# Patient Record
Sex: FEMALE | Race: WHITE | Employment: PART TIME | URBAN - METROPOLITAN AREA
[De-identification: names, ages, dates, MRNs, and addresses within clinical notes are randomized per-mention and may not be internally consistent; named-entity substitution may affect disease eponyms.]

---

## 2018-07-29 ENCOUNTER — APPOINTMENT (OUTPATIENT)
Dept: CT IMAGING | Age: 23
End: 2018-07-29
Payer: COMMERCIAL

## 2018-07-29 ENCOUNTER — HOSPITAL ENCOUNTER (EMERGENCY)
Age: 23
Discharge: HOME | End: 2018-07-29
Payer: COMMERCIAL

## 2018-07-29 VITALS
OXYGEN SATURATION: 99 % | RESPIRATION RATE: 16 BRPM | HEART RATE: 90 BPM | SYSTOLIC BLOOD PRESSURE: 134 MMHG | DIASTOLIC BLOOD PRESSURE: 70 MMHG | WEIGHT: 210 LBS | HEIGHT: 66 IN | TEMPERATURE: 97.9 F | BODY MASS INDEX: 33.75 KG/M2
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
## 2018-07-29 DIAGNOSIS — S09.90XA INJURY OF HEAD, INITIAL ENCOUNTER: ICD-10-CM

## 2018-07-29 DIAGNOSIS — R51.9 ACUTE NONINTRACTABLE HEADACHE, UNSPECIFIED HEADACHE TYPE: Primary | ICD-10-CM

## 2018-07-29 LAB
CHP ED QC CHECK: NORMAL
PREGNANCY TEST URINE, POC: NORMAL

## 2018-07-29 PROCEDURE — 99284 EMERGENCY DEPT VISIT MOD MDM: CPT

## 2018-07-29 PROCEDURE — 70450 CT HEAD/BRAIN W/O DYE: CPT

## 2018-07-29 PROCEDURE — 72125 CT NECK SPINE W/O DYE: CPT

## 2019-05-15 ENCOUNTER — HOSPITAL ENCOUNTER (EMERGENCY)
Age: 24
Discharge: HOME | End: 2019-05-16
Attending: EMERGENCY MEDICINE
Payer: COMMERCIAL

## 2019-05-15 ENCOUNTER — APPOINTMENT (OUTPATIENT)
Dept: GENERAL RADIOLOGY | Age: 24
End: 2019-05-15
Payer: COMMERCIAL

## 2019-05-15 VITALS
WEIGHT: 180 LBS | SYSTOLIC BLOOD PRESSURE: 112 MMHG | BODY MASS INDEX: 28.93 KG/M2 | HEIGHT: 66 IN | OXYGEN SATURATION: 98 % | TEMPERATURE: 98.1 F | DIASTOLIC BLOOD PRESSURE: 72 MMHG | HEART RATE: 79 BPM | RESPIRATION RATE: 16 BRPM

## 2019-05-15 DIAGNOSIS — S52.612A DISPLACED FRACTURE OF LEFT ULNA STYLOID PROCESS, INITIAL ENCOUNTER FOR CLOSED FRACTURE: ICD-10-CM

## 2019-05-15 DIAGNOSIS — S52.352A CLOSED DISPLACED COMMINUTED FRACTURE OF SHAFT OF LEFT RADIUS, INITIAL ENCOUNTER: Primary | ICD-10-CM

## 2019-05-15 LAB — HCG(URINE) PREGNANCY TEST: NEGATIVE

## 2019-05-15 PROCEDURE — 96374 THER/PROPH/DIAG INJ IV PUSH: CPT

## 2019-05-15 PROCEDURE — 99284 EMERGENCY DEPT VISIT MOD MDM: CPT

## 2019-05-15 PROCEDURE — 6360000002 HC RX W HCPCS: Performed by: STUDENT IN AN ORGANIZED HEALTH CARE EDUCATION/TRAINING PROGRAM

## 2019-05-15 PROCEDURE — 73110 X-RAY EXAM OF WRIST: CPT

## 2019-05-15 PROCEDURE — 81025 URINE PREGNANCY TEST: CPT

## 2019-05-15 RX ORDER — FENTANYL CITRATE 50 UG/ML
50 INJECTION, SOLUTION INTRAMUSCULAR; INTRAVENOUS ONCE
Status: COMPLETED | OUTPATIENT
Start: 2019-05-15 | End: 2019-05-15

## 2019-05-15 RX ADMIN — FENTANYL CITRATE 50 MCG: 50 INJECTION INTRAMUSCULAR; INTRAVENOUS at 22:13

## 2019-05-15 RX ADMIN — FENTANYL CITRATE 50 MCG: 50 INJECTION INTRAMUSCULAR; INTRAVENOUS at 20:15

## 2019-05-15 ASSESSMENT — PAIN DESCRIPTION - FREQUENCY
FREQUENCY: CONTINUOUS
FREQUENCY: CONTINUOUS

## 2019-05-15 ASSESSMENT — PAIN DESCRIPTION - ORIENTATION
ORIENTATION: LEFT

## 2019-05-15 ASSESSMENT — PAIN DESCRIPTION - PAIN TYPE
TYPE: ACUTE PAIN

## 2019-05-15 ASSESSMENT — PAIN SCALES - GENERAL
PAINLEVEL_OUTOF10: 10

## 2019-05-15 ASSESSMENT — ENCOUNTER SYMPTOMS
SINUS PAIN: 0
NAUSEA: 0
DIARRHEA: 0
VOMITING: 0
SHORTNESS OF BREATH: 0
COUGH: 0
EYE REDNESS: 0
ABDOMINAL PAIN: 0
EYE PAIN: 0
CONSTIPATION: 0
SORE THROAT: 0

## 2019-05-15 ASSESSMENT — PAIN DESCRIPTION - LOCATION
LOCATION: ARM

## 2019-05-15 ASSESSMENT — PAIN DESCRIPTION - DESCRIPTORS: DESCRIPTORS: HEAVINESS;SHARP

## 2019-05-15 NOTE — ED PROVIDER NOTES
The patient is a 21-year-old female presenting with arm injury. Patient fell on left wrist playing tennis 30 minutes prior to arrival. Patient was running for a ball when she reached back and landed on her wrist and a [address] injury. Patient is holding her left wrist on her side and unwilling to remove her from the side. They are neurovascularly intact in that left upper extremity. She has pain and bony tenderness to her left wrist. Some swelling present in left wrist. She has ice placed over top. She is not taking anything for pain. She has no prior injuries to the area. There is decreased range of motion secondary to pain. The history is provided by the patient. Review of Systems   Constitutional: Negative for chills and fever. HENT: Negative for congestion, sinus pain and sore throat. Eyes: Negative for pain and redness. Respiratory: Negative for cough and shortness of breath. Cardiovascular: Negative for chest pain and palpitations. Gastrointestinal: Negative for abdominal pain, constipation, diarrhea, nausea and vomiting. Endocrine: Negative for polyuria. Genitourinary: Negative for difficulty urinating, dysuria, frequency and hematuria. Musculoskeletal: Positive for joint swelling (Left wrist). Negative for neck pain. Skin: Negative. Neurological: Negative for dizziness, weakness, light-headedness and headaches. Hematological: Negative. Psychiatric/Behavioral: Negative for agitation and confusion. Physical Exam   Constitutional: She is oriented to person, place, and time. She appears well-developed and well-nourished. HENT:   Head: Normocephalic and atraumatic. Eyes: Pupils are equal, round, and reactive to light. Conjunctivae and EOM are normal.   Neck: Normal range of motion. No JVD present. Cardiovascular: Normal rate, regular rhythm, normal heart sounds and intact distal pulses.    Pulmonary/Chest: Effort normal and breath sounds normal. No respiratory distress. She has no wheezes. Abdominal: Soft. Bowel sounds are normal. She exhibits no distension and no mass. There is no tenderness. There is no guarding. Musculoskeletal: She exhibits no edema. Left elbow: She exhibits decreased range of motion. Left wrist: She exhibits decreased range of motion, tenderness, bony tenderness, swelling and crepitus. She exhibits no deformity and no laceration. Right hand: Normal sensation noted. Decreased strength: Unable to obtain due to pain in the left wrist.   No snuffbox tenderness. Neurological: She is oriented to person, place, and time. Skin: Skin is warm and dry. Psychiatric: She has a normal mood and affect. Vitals reviewed. Procedures    MDM    ED Course as of May 15 2306   Wed May 15, 2019   2144 1. Comminuted displaced intra-articular fracture of the distal left  radius. 2. Fracture of the ulnar styloid process. [WL]   2153 Spoke with Jonelle Yin, answering for Dr. Laurence Crawley, orthopedics, he states he described in right now and will come to see the patient once he has finished.    [WL]   2232 Ice was placed over the left wrist and the patient was given fentanyl for pain. [WL]      ED Course User Index  [WL] Cinthia Harvey DO     Patient presents to the ED for   Chief Complaint   Patient presents with    Arm Injury     left wrist, fell on it while playing tennis 30 min pta    . Patient continues to be non-toxic on re-evaluation. Findings were discussed with the patient and reasons to immediately return to the ED were articulated to them. They will follow-up with their PMD.      --------------------------------------------- PAST HISTORY ---------------------------------------------  Past Medical History:  has a past medical history of Anxiety. Past Surgical History:  has a past surgical history that includes Cholecystectomy. Social History:  reports that she quit smoking about 2 years ago.  She has a 0.75 pack-year smoking history. She has never used smokeless tobacco. She reports that she drinks alcohol. She reports that she does not use drugs. Family History: family history includes Diabetes in her paternal uncle; Other in her mother. The patients home medications have been reviewed. Allergies: Patient has no known allergies. -------------------------------------------------- RESULTS -------------------------------------------------  Labs:  Results for orders placed or performed during the hospital encounter of 05/15/19   Pregnancy, Urine   Result Value Ref Range    HCG(Urine) Pregnancy Test NEGATIVE NEGATIVE       Radiology:  XR WRIST LEFT (MIN 3 VIEWS)   Final Result   1. Comminuted displaced intra-articular fracture of the distal left   radius. 2. Fracture of the ulnar styloid process. ------------------------- NURSING NOTES AND VITALS REVIEWED ---------------------------  Date / Time Roomed:  5/15/2019  7:51 PM  ED Bed Assignment:  02/02    The nursing notes within the ED encounter and vital signs as below have been reviewed. /72   Pulse 79   Temp 98.1 °F (36.7 °C) (Oral)   Resp 16   Ht 5' 6\" (1.676 m)   Wt 180 lb (81.6 kg)   LMP 05/01/2019   SpO2 98%   BMI 29.05 kg/m²           --------------------------------- ADDITIONAL PROVIDER NOTES ---------------------------------  At this time the patient is without objective evidence of an acute process requiring hospitalization or inpatient management. They have remained hemodynamically stable throughout their entire ED visit and are stable for discharge with outpatient follow-up. The plan has been discussed in detail and they are aware of the specific conditions for emergent return, as well as the importance of follow-up. New Prescriptions    No medications on file       Diagnosis:  1. Closed displaced comminuted fracture of shaft of left radius, initial encounter    2.  Displaced fracture of left ulna styloid process, initial encounter for closed fracture        Disposition:  Patient's disposition: Discharge  Patient's condition is stable.          Julisa Conrad DO  Resident  05/15/19 1476

## 2019-05-16 ENCOUNTER — APPOINTMENT (OUTPATIENT)
Dept: GENERAL RADIOLOGY | Age: 24
End: 2019-05-16
Payer: COMMERCIAL

## 2019-05-16 PROCEDURE — 6360000002 HC RX W HCPCS: Performed by: STUDENT IN AN ORGANIZED HEALTH CARE EDUCATION/TRAINING PROGRAM

## 2019-05-16 PROCEDURE — 6370000000 HC RX 637 (ALT 250 FOR IP): Performed by: EMERGENCY MEDICINE

## 2019-05-16 PROCEDURE — 73110 X-RAY EXAM OF WRIST: CPT

## 2019-05-16 PROCEDURE — 96376 TX/PRO/DX INJ SAME DRUG ADON: CPT

## 2019-05-16 RX ORDER — ONDANSETRON 4 MG/1
4 TABLET, ORALLY DISINTEGRATING ORAL ONCE
Status: COMPLETED | OUTPATIENT
Start: 2019-05-16 | End: 2019-05-16

## 2019-05-16 RX ORDER — FENTANYL CITRATE 50 UG/ML
50 INJECTION, SOLUTION INTRAMUSCULAR; INTRAVENOUS ONCE
Status: COMPLETED | OUTPATIENT
Start: 2019-05-16 | End: 2019-05-16

## 2019-05-16 RX ORDER — OXYCODONE HYDROCHLORIDE AND ACETAMINOPHEN 5; 325 MG/1; MG/1
1 TABLET ORAL ONCE
Status: COMPLETED | OUTPATIENT
Start: 2019-05-16 | End: 2019-05-16

## 2019-05-16 RX ORDER — OXYCODONE HYDROCHLORIDE AND ACETAMINOPHEN 5; 325 MG/1; MG/1
1 TABLET ORAL EVERY 6 HOURS PRN
Qty: 12 TABLET | Refills: 0 | Status: SHIPPED | OUTPATIENT
Start: 2019-05-16 | End: 2019-05-19

## 2019-05-16 RX ORDER — ONDANSETRON 4 MG/1
4 TABLET, ORALLY DISINTEGRATING ORAL EVERY 8 HOURS PRN
Qty: 10 TABLET | Refills: 0 | Status: SHIPPED | OUTPATIENT
Start: 2019-05-16 | End: 2020-05-15

## 2019-05-16 RX ADMIN — ONDANSETRON 4 MG: 4 TABLET, ORALLY DISINTEGRATING ORAL at 01:08

## 2019-05-16 RX ADMIN — FENTANYL CITRATE 50 MCG: 50 INJECTION INTRAMUSCULAR; INTRAVENOUS at 00:17

## 2019-05-16 RX ADMIN — OXYCODONE HYDROCHLORIDE AND ACETAMINOPHEN 1 TABLET: 5; 325 TABLET ORAL at 01:07

## 2019-05-16 NOTE — CONSULTS
cooperative, no apparent distress, and appears stated age  MUSCULOSKELETAL:  Left upper Extremity:  · Skins intact circumferentially  · Mild deformity to the distal radius  · Mild edema over the dorsal aspect of the distal radius  · Positive TTP about the distal radius  · Patient unable to flex and extend the wrist secondarily due to pain. Patient is able to flex and extend her fingers  · No pain with palpation about the elbow, proximal radius and ulna, humerus, shoulder  · Sensations intact to light touch in the median, ulnar, radial nerve distribution  · Positive AIN, PIN, ulnar motor nerve function to the hand  · +2/4 radial pulse    Secondary Exam:   · rightUE: No obvious signs of trauma. -TTP to fingers, hand, wrist, forearm, elbow, humerus, shoulder or clavicle. -- Patient able to flex/extend fingers, wrist, elbow and shoulder with active and passive ROM without pain, +2/4 Radial pulse, cap refill <3sec, +AIN/PIN/Radial/Ulnar/Median N, distal sensation grossly intact to C4-T1 dermatomes, compartments soft and compressible. · bilateralLE: No obvious signs of trauma. -TTP to foot, ankle, leg, knee, thigh, hip.-- Patient able to flex/extend toes, ankle, knee and hip with active and passive ROM without pain,+2/4 DP & PT pulses, cap refill <3sec, +5/5 PF/DF/EHL, distal sensation grossly intact to L4-S1 dermatomes, compartments soft and compressible. · Pelvis: -TTP, -Log roll, -Heel strike     DATA:    CBC: No results found for: WBC, RBC, HGB, HCT, MCV, MCH, MCHC, RDW, PLT, MPV  PT/INR:  No results found for: PROTIME, INR    Radiology Review:  X-ray left wrist  Demonstrating a comminuted intra-articular distal radius fracture with dorsal angulation and shortening.  There is also an associated ulnar styloid fracture    IMPRESSION:  · Distal radius fracture  · Ulnar styloid fracture    PLAN:  · Nonweightbearing to the left upper extremity  · After informed consent the skin was cleaned with alcohol and 10 mL of 1% lidocaine was injected for a hematoma block. The patient subsequently underwent closed reduction and was placed in a well-padded sugar tong splint. Patient remained neurovascularly intact pre-and postreduction  · Ice and elevate left upper extremity  · Pain control per ED  · Instructed patient to follow up in office with Dr. Bernarda Menendez.  Instructed patient to call to schedule an appointment  · Discussed with Dr. Bernarda Menendez

## 2019-05-16 NOTE — ED NOTES
Patient aware we are waiting for ortho to come to ED to evaluate her.      Libby Floyd RN  05/15/19 9499

## 2022-12-28 ENCOUNTER — APPOINTMENT (OUTPATIENT)
Dept: ULTRASOUND IMAGING | Age: 27
End: 2022-12-28
Payer: COMMERCIAL

## 2022-12-28 ENCOUNTER — HOSPITAL ENCOUNTER (EMERGENCY)
Age: 27
Discharge: HOME | End: 2022-12-28
Payer: COMMERCIAL

## 2022-12-28 VITALS
RESPIRATION RATE: 22 BRPM | DIASTOLIC BLOOD PRESSURE: 90 MMHG | WEIGHT: 180 LBS | TEMPERATURE: 97.3 F | HEART RATE: 95 BPM | BODY MASS INDEX: 29.05 KG/M2 | OXYGEN SATURATION: 97 % | SYSTOLIC BLOOD PRESSURE: 135 MMHG

## 2022-12-28 DIAGNOSIS — N76.0 VAGINITIS AND VULVOVAGINITIS: ICD-10-CM

## 2022-12-28 DIAGNOSIS — S76.912A MUSCLE STRAIN OF LEFT THIGH, INITIAL ENCOUNTER: Primary | ICD-10-CM

## 2022-12-28 LAB
CLUE CELLS: NORMAL
SOURCE WET PREP: NORMAL
TRICHOMONAS PREP: NORMAL
YEAST WET PREP: NORMAL

## 2022-12-28 PROCEDURE — 99284 EMERGENCY DEPT VISIT MOD MDM: CPT

## 2022-12-28 PROCEDURE — 93971 EXTREMITY STUDY: CPT

## 2022-12-28 PROCEDURE — 87210 SMEAR WET MOUNT SALINE/INK: CPT

## 2022-12-28 RX ORDER — OMEPRAZOLE 20 MG/1
40 CAPSULE, DELAYED RELEASE ORAL DAILY
COMMUNITY

## 2022-12-28 ASSESSMENT — PAIN - FUNCTIONAL ASSESSMENT: PAIN_FUNCTIONAL_ASSESSMENT: NONE - DENIES PAIN

## 2022-12-28 NOTE — ED PROVIDER NOTES
[facility] [employer] [address] [facility]  Department of Emergency Medicine   ED  Encounter Note  Admit Date/RoomTime: 2022  6:40 PM  ED Room:   NAME: Andre Craft  : 1995  MRN: [id number]     Chief Complaint:  Leg Pain (Left leg pain, upper thigh. Warm to touch no redness. Started yesterday. Denies injury)    HISTORY OF PRESENT ILLNESS        Andre Craft is a 28 y.o. female who presents to the ED with 2 separate complaints. Patient complains of left thigh pain. Patient states since yesterday she has had a pain to the inner aspect of her left thigh. Area is also felt warm to her. Patient does admit she works out a lot, and was doing thigh presses. But states that she has never had this pain before with workouts. Patient wants to be checked to see if her BV is gone or if she has a yeast infection. Patient states that she has been using boric acid suppositories for bacterial vaginosis. She states the discharge and odor are okay. But she still feels a little itchy down there. Now concerned she might have a yeast infection. Patient denies any shortness of breath. Denies abdominal pain. Denies any STD concerns. Denies any dysuria. Symptoms are moderate in severity. ROS   Pertinent positives and negatives are stated within HPI, all other systems reviewed and are negative. Past Medical History:  has a past medical history of Anxiety. Surgical History:  has a past surgical history that includes Cholecystectomy. Social History:  reports that she quit smoking about 5 years ago. She has a 0.75 pack-year smoking history. She has never used smokeless tobacco. She reports current alcohol use. She reports that she does not use drugs. Family History: family history includes Diabetes in her paternal uncle; Other in her mother. Allergies: Patient has no known allergies. PHYSICAL EXAM   Oxygen Saturation Interpretation: Normal on room air analysis. ED Triage Vitals   BP Temp Temp Source Heart Rate Resp SpO2 Height Weight   12/28/22 1836 12/28/22 1829 12/28/22 1829 12/28/22 1829 12/28/22 1829 12/28/22 1829 -- 12/28/22 1836   (!) 135/90 97.3 °F (36.3 °C) Infrared 95 22 97 %  180 lb (81.6 kg)         General:  NAD. Alert and Oriented. Well-appearing. Skin:  Warm, dry. No rashes. Head:  Normocephalic. Atraumatic. Eyes:  EOMI. Conjunctiva normal.  ENT:  Oral mucosa moist.  Airway patent. Neck:  Supple. Normal ROM. Respiratory:  No respiratory distress. No labored breathing. Lungs clear without rales, rhonchi or wheezing. Cardiovascular:  Regular rate. No Murmur. No peripheral edema. Extremities warm and good color. Chest:  Abdomen:  Soft, nondistended. Normal bowel sounds. Nontender to palpation all 4 quadrants. Negative rebound, negative guarding. Rectal:  Gu: Bladder nontender and non distended. No CVA tenderness. Pelvic:  Extremities:  Normal ROM. Nontender to palpation. Atraumatic. Left thigh is not swollen. No areas of erythema. No warmth. No palpable inflamed vessel. No rash. Calves are not swollen. Calves are equal in size. Back:  Normal ROM. Nontender to palpation. Neuro:  Alert and Oriented to person, place, time and situation. Normal LOC. Moves all extremities. Speech fluent. Psych:  Calm and Cooperative. Normal thought process. Normal judgement. Lab / Imaging Results   (All laboratory and radiology results have been personally reviewed by myself)  Labs:  Results for orders placed or performed during the hospital encounter of 12/28/22   Wet prep, genital    Specimen: Vaginal   Result Value Ref Range    Trichomonas Prep None Seen     Yeast, Wet Prep None Seen     Clue Cells, Wet Prep None Seen     Source Wet Prep VAGINAL      Imaging: All Radiology results interpreted by Radiologist unless otherwise noted. US DUP LOWER EXTREMITY LEFT MELVA   Final Result   No evidence of DVT in the left lower extremity. ED Course / Medical Decision Making   Medications - No data to display     Re-examination:  12/28/22       Time:   Patients condition . Consult(s):   None    Procedure(s):   None    MDM:     19-year-old female complains of left thigh pain. Ultrasound is negative for DVT, negative for thrombophlebitis. We discussed diagnosis of musculoskeletal pain secondary to heavy physical workout. Wet prep is negative for BV, negative for trichomoniasis, negative for yeast.  I did discuss with her that if she is working out a lot she may be sweating and that may be the cause of her itchiness. I did advise her she could try Gyne-Lotrimin over-the-counter to see if that helps. Plan of Care/Counseling:  Jonathan Garrido reviewed today's visit with the patient in addition to providing specific details for the plan of care and counseling regarding the diagnosis and prognosis. Questions are answered at this time and are agreeable with the plan. ASSESSMENT     1. Muscle strain of left thigh, initial encounter New Problem   2. Vaginitis and vulvovaginitis New Problem     PLAN   Discharged home. Patient condition is good    New Medications     New Prescriptions    No medications on file     Electronically signed by MAGO Garrido   DD: 12/28/22  **This report was transcribed using voice recognition software. Every effort was made to ensure accuracy; however, inadvertent computerized transcription errors may be present.   END OF ED PROVIDER NOTE       Jonathan Garrido  12/28/22 2033

## 2023-01-19 ENCOUNTER — HOSPITAL ENCOUNTER (EMERGENCY)
Age: 28
Discharge: HOME | End: 2023-01-19
Attending: EMERGENCY MEDICINE
Payer: MEDICAID

## 2023-01-19 VITALS
SYSTOLIC BLOOD PRESSURE: 106 MMHG | OXYGEN SATURATION: 100 % | DIASTOLIC BLOOD PRESSURE: 71 MMHG | HEART RATE: 69 BPM | RESPIRATION RATE: 18 BRPM | TEMPERATURE: 96.9 F

## 2023-01-19 DIAGNOSIS — H66.90 ACUTE OTITIS MEDIA, UNSPECIFIED OTITIS MEDIA TYPE: Primary | ICD-10-CM

## 2023-01-19 PROCEDURE — 6370000000 HC RX 637 (ALT 250 FOR IP): Performed by: EMERGENCY MEDICINE

## 2023-01-19 PROCEDURE — 99283 EMERGENCY DEPT VISIT LOW MDM: CPT

## 2023-01-19 RX ORDER — AMOXICILLIN AND CLAVULANATE POTASSIUM 500; 125 MG/1; MG/1
1 TABLET, FILM COATED ORAL 3 TIMES DAILY
Qty: 30 TABLET | Refills: 0 | Status: SHIPPED | OUTPATIENT
Start: 2023-01-19 | End: 2023-01-29

## 2023-01-19 RX ORDER — AMOXICILLIN AND CLAVULANATE POTASSIUM 875; 125 MG/1; MG/1
1 TABLET, FILM COATED ORAL ONCE
Status: COMPLETED | OUTPATIENT
Start: 2023-01-19 | End: 2023-01-19

## 2023-01-19 RX ADMIN — AMOXICILLIN AND CLAVULANATE POTASSIUM 1 TABLET: 875; 125 TABLET, FILM COATED ORAL at 02:25

## 2023-01-19 ASSESSMENT — ENCOUNTER SYMPTOMS
WHEEZING: 0
DIARRHEA: 0
EYE DISCHARGE: 0
NAUSEA: 0
BACK PAIN: 0
ABDOMINAL DISTENTION: 0
SINUS PRESSURE: 0
SORE THROAT: 0
VOMITING: 0
EYE PAIN: 0
COUGH: 0
SHORTNESS OF BREATH: 0
EYE REDNESS: 0

## 2023-01-19 NOTE — ED PROVIDER NOTES
Patient is a 29 y/o female who presents to the ED with right ear pain. Patient states that she has had pain in her right ear for the past couple days. She states that her mom told her that she had an ear infection and needed to come to the ED for antibiotics. She states that her right ear hurts when she pulls on it. She denies any fever, cough or sore throat. She denies any recent swimming. Review of Systems   Constitutional:  Negative for chills and fever. HENT:  Positive for ear pain. Negative for sinus pressure and sore throat. Eyes:  Negative for pain, discharge and redness. Respiratory:  Negative for cough, shortness of breath and wheezing. Cardiovascular:  Negative for chest pain. Gastrointestinal:  Negative for abdominal distention, diarrhea, nausea and vomiting. Genitourinary:  Negative for dysuria and frequency. Musculoskeletal:  Negative for arthralgias and back pain. Skin:  Negative for rash and wound. Neurological:  Negative for weakness and headaches. Hematological:  Negative for adenopathy. All other systems reviewed and are negative. Physical Exam  Vitals and nursing note reviewed. Constitutional:       General: She is not in acute distress. HENT:      Head: Normocephalic and atraumatic. Right Ear: Tympanic membrane, ear canal and external ear normal.      Left Ear: Tympanic membrane, ear canal and external ear normal.      Nose: Nose normal.      Mouth/Throat:      Mouth: Mucous membranes are moist.   Eyes:      Conjunctiva/sclera: Conjunctivae normal.      Pupils: Pupils are equal, round, and reactive to light. Cardiovascular:      Rate and Rhythm: Normal rate and regular rhythm. Heart sounds: No murmur heard. Pulmonary:      Effort: Pulmonary effort is normal. No respiratory distress. Breath sounds: Normal breath sounds. No stridor. No wheezing, rhonchi or rales. Abdominal:      General: Bowel sounds are normal. There is no distension. Palpations: Abdomen is soft.      Tenderness: There is no abdominal tenderness. There is no guarding.   Musculoskeletal:         General: Normal range of motion.      Cervical back: Normal range of motion and neck supple.   Skin:     General: Skin is warm and dry.      Findings: No rash.   Neurological:      Mental Status: She is alert and oriented to person, place, and time.        Procedures     MDM     History from : Patient    Limitations to history : None    Chronic Conditions: Anxiety    CONSULTS: (Who and What was discussed)  None    Discussion with Other Profesionals : None    Social Determinants : None    Records Reviewed : None    CC/HPI Summary, DDx, ED Course, and Reassessment: Patient is a 26 y/o female who presents to the ED with right ear pain. Patient states that she has had pain in her right ear for the past couple days. She states that her mom told her that she had an ear infection and needed to come to the ED for antibiotics. She states that her right ear hurts when she pulls on it. She denies any fever, cough or sore throat. She denies any recent swimming. Augmentin 875 mg PO given in the ED. Prescription for Augmentin 500 mg PO TID. Discharge for outpatient follow up.      Disposition Considerations (Tests not ordered but considered, Shared Decision Making, Pt Expectation of Test or Tx.): Differential diagnosis includes otitis externa and otitis media.  Appropriate for outpatient management        I am the Primary Clinician of Record.            --------------------------------------------- PAST HISTORY ---------------------------------------------  Past Medical History:  has a past medical history of Anxiety.    Past Surgical History:  has a past surgical history that includes Cholecystectomy.    Social History:  reports that she quit smoking about 5 years ago. She has a 0.75 pack-year smoking history. She has never used smokeless tobacco. She reports current alcohol use. She reports that she  does not use drugs. Family History: family history includes Diabetes in her paternal uncle; Other in her mother. The patients home medications have been reviewed. Allergies: Patient has no known allergies. -------------------------------------------------- RESULTS -------------------------------------------------  Labs:  No results found for this visit on 01/19/23. Radiology:  No orders to display       ------------------------- NURSING NOTES AND VITALS REVIEWED ---------------------------  Date / Time Roomed:  1/19/2023  1:23 AM  ED Bed Assignment:  12/12    The nursing notes within the ED encounter and vital signs as below have been reviewed. /71   Pulse 69   Temp 96.9 °F (36.1 °C) (Tympanic)   Resp 18   LMP 01/17/2023 (Exact Date)   SpO2 100%   Oxygen Saturation Interpretation: Normal      ------------------------------------------ PROGRESS NOTES ------------------------------------------  I have spoken with the patient and discussed todays results, in addition to providing specific details for the plan of care and counseling regarding the diagnosis and prognosis. Their questions are answered at this time and they are agreeable with the plan. I discussed at length with them reasons for immediate return here for re evaluation. They will followup with primary care by calling their office tomorrow. --------------------------------- ADDITIONAL PROVIDER NOTES ---------------------------------  At this time the patient is without objective evidence of an acute process requiring hospitalization or inpatient management. They have remained hemodynamically stable throughout their entire ED visit and are stable for discharge with outpatient follow-up. The plan has been discussed in detail and they are aware of the specific conditions for emergent return, as well as the importance of follow-up.       New Prescriptions    AMOXICILLIN-CLAVULANATE (AUGMENTIN) 500-125 MG PER TABLET Take 1 tablet by mouth 3 times daily for 10 days       Diagnosis:  1. Acute otitis media, unspecified otitis media type        Disposition:  Patient's disposition: Discharge to home  Patient's condition is stable.             [address] [facility],   01/19/23 9102

## 2023-02-22 ENCOUNTER — HOSPITAL ENCOUNTER (EMERGENCY)
Age: 28
Discharge: LEFT BEFORE DISCHARGE | End: 2023-02-22
Attending: EMERGENCY MEDICINE
Payer: COMMERCIAL

## 2023-02-22 ENCOUNTER — APPOINTMENT (OUTPATIENT)
Dept: GENERAL RADIOLOGY | Age: 28
End: 2023-02-22
Payer: COMMERCIAL

## 2023-02-22 VITALS
HEART RATE: 79 BPM | SYSTOLIC BLOOD PRESSURE: 121 MMHG | RESPIRATION RATE: 18 BRPM | BODY MASS INDEX: 28.93 KG/M2 | HEIGHT: 66 IN | DIASTOLIC BLOOD PRESSURE: 60 MMHG | WEIGHT: 180 LBS | OXYGEN SATURATION: 100 % | TEMPERATURE: 98.7 F

## 2023-02-22 DIAGNOSIS — M79.604 RIGHT LEG PAIN: Primary | ICD-10-CM

## 2023-02-22 LAB
BASOPHILS ABSOLUTE: 0.06 E9/L (ref 0–0.2)
BASOPHILS RELATIVE PERCENT: 0.5 % (ref 0–2)
D DIMER: <200 NG/ML DDU
EOSINOPHILS ABSOLUTE: 0.2 E9/L (ref 0.05–0.5)
EOSINOPHILS RELATIVE PERCENT: 1.8 % (ref 0–6)
HCT VFR BLD CALC: 38.5 % (ref 34–48)
HEMOGLOBIN: 12.9 G/DL (ref 11.5–15.5)
IMMATURE GRANULOCYTES #: 0.08 E9/L
IMMATURE GRANULOCYTES %: 0.7 % (ref 0–5)
LYMPHOCYTES ABSOLUTE: 3.35 E9/L (ref 1.5–4)
LYMPHOCYTES RELATIVE PERCENT: 30.3 % (ref 20–42)
MCH RBC QN AUTO: 30.1 PG (ref 26–35)
MCHC RBC AUTO-ENTMCNC: 33.5 % (ref 32–34.5)
MCV RBC AUTO: 89.7 FL (ref 80–99.9)
MONOCYTES ABSOLUTE: 1 E9/L (ref 0.1–0.95)
MONOCYTES RELATIVE PERCENT: 9.1 % (ref 2–12)
NEUTROPHILS ABSOLUTE: 6.35 E9/L (ref 1.8–7.3)
NEUTROPHILS RELATIVE PERCENT: 57.6 % (ref 43–80)
PDW BLD-RTO: 12.2 FL (ref 11.5–15)
PLATELET # BLD: 280 E9/L (ref 130–450)
PMV BLD AUTO: 9.9 FL (ref 7–12)
RBC # BLD: 4.29 E12/L (ref 3.5–5.5)
WBC # BLD: 11 E9/L (ref 4.5–11.5)

## 2023-02-22 PROCEDURE — 36415 COLL VENOUS BLD VENIPUNCTURE: CPT

## 2023-02-22 PROCEDURE — 85025 COMPLETE CBC W/AUTO DIFF WBC: CPT

## 2023-02-22 PROCEDURE — 85378 FIBRIN DEGRADE SEMIQUANT: CPT

## 2023-02-22 PROCEDURE — 6370000000 HC RX 637 (ALT 250 FOR IP): Performed by: STUDENT IN AN ORGANIZED HEALTH CARE EDUCATION/TRAINING PROGRAM

## 2023-02-22 PROCEDURE — 99283 EMERGENCY DEPT VISIT LOW MDM: CPT

## 2023-02-22 RX ORDER — NAPROXEN 500 MG/1
500 TABLET ORAL ONCE
Status: COMPLETED | OUTPATIENT
Start: 2023-02-22 | End: 2023-02-22

## 2023-02-22 RX ADMIN — NAPROXEN 500 MG: 500 TABLET ORAL at 04:29

## 2023-02-22 ASSESSMENT — PAIN SCALES - GENERAL
PAINLEVEL_OUTOF10: 4
PAINLEVEL_OUTOF10: 4

## 2023-02-22 ASSESSMENT — PAIN DESCRIPTION - LOCATION
LOCATION: LEG
LOCATION: KNEE

## 2023-02-22 ASSESSMENT — PAIN - FUNCTIONAL ASSESSMENT: PAIN_FUNCTIONAL_ASSESSMENT: 0-10

## 2023-02-22 ASSESSMENT — PAIN DESCRIPTION - ORIENTATION
ORIENTATION: RIGHT
ORIENTATION: RIGHT

## 2023-02-22 ASSESSMENT — PAIN DESCRIPTION - DESCRIPTORS
DESCRIPTORS: ACHING
DESCRIPTORS: ACHING

## 2023-02-22 NOTE — ED PROVIDER NOTES
[address] ENCOUNTER        Pt Name: Linnette Mazariegos  MRN: [id number]  [place] 1995  Date of evaluation: 2/22/2023  Provider: Willian Lincoln DO  PCP: No primary care provider on file. Note Started: 5:46 AM EST 2/22/23    CHIEF COMPLAINT       Chief Complaint   Patient presents with    Leg Pain     Reports had right leg crossed over left leg today for awhile around 3pm, and since then has felt pain in the back of the right knee and thigh. Able to walk, no weakness present. HISTORY OF PRESENT ILLNESS: 1 or more Elements   History From: Patient    Limitations to history : None    Linnette Mazariegos is a 28 y.o. female Past medical history of anxiety. Patient presents ED complaint of right leg pain. Patient states that she developed right leg pain around 3:00 yesterday. Patient states that she was at work and had her legs crossed majority of the day. Patient states that she developed pain behind her right knee. Patient described the pain as a numbness and tingling sensation with radiation down her right leg. Patient currently rates pain a 4 out of 10. Patient denies any exacerbating relieving factors. States that symptoms have been moderate in severity and constant onset. Patient states that she is having difficulty sleeping tonight thus presented to the emergency department for further evaluation. Patient denies any fevers, chills, nausea, vomiting, chest pain, cough or shortness of breath. Nursing Notes were all reviewed and agreed with or any disagreements were addressed in the HPI. REVIEW OF SYSTEMS :           Positives and Pertinent negatives as per HPI.      SURGICAL HISTORY     Past Surgical History:   Procedure Laterality Date    CHOLECYSTECTOMY         CURRENTMEDICATIONS       Discharge Medication List as of 2/22/2023  5:46 AM        CONTINUE these medications which have NOT CHANGED    Details   omeprazole (PRILOSEC) 20 MG delayed release capsule Take 40 mg by mouth dailyHistorical Med      ALAYCEN  1-35 MG-MCG per tablet TAKE 1 TABLET BY MOUTH EVERY DAY, R-4, PRIETO             ALLERGIES     Patient has no known allergies. FAMILYHISTORY       Family History   Problem Relation Age of Onset    Other Mother         anxiety     Diabetes Paternal Uncle         SOCIAL HISTORY       Social History     Tobacco Use    Smoking status: Former     Packs/day: 0.25     Years: 3.00     Pack years: 0.75     Types: Cigarettes     Quit date: 2017     Years since quittin.0    Smokeless tobacco: Never   Vaping Use    Vaping Use: Every day   Substance Use Topics    Alcohol use: Yes     Comment: once a week    Drug use: No       SCREENINGS        [place] Coma Scale  Eye Opening: Spontaneous  Best Verbal Response: Oriented  Best Motor Response: Obeys commands  Yt Coma Scale Score: 15                CIWA Assessment  BP: 121/60  Heart Rate: 79           PHYSICAL EXAM  1 or more Elements     ED Triage Vitals   BP Temp Temp Source Heart Rate Resp SpO2 Height Weight   23 0404 23 0359 23 0359 23 0359 23 0404 23 0359 23 0404 23 0404   121/60 98.7 °F (37.1 °C) Oral 77 18 100 % 5' 6\" (1.676 m) 180 lb (81.6 kg)         Constitutional/General: Alert and oriented x3  Head: Normocephalic and atraumatic  Eyes: PERRL, EOMI, conjunctiva normal, sclera non icteric  ENT:  Oropharynx clear, handling secretions, no trismus, no asymmetry of the posterior oropharynx or uvular edema  Neck: Supple, full ROM, no stridor, no meningeal signs  Respiratory: Lungs clear to auscultation bilaterally, no wheezes, rales, or rhonchi. Not in respiratory distress  Cardiovascular:  Regular rate. Regular rhythm. No murmurs, no gallops, no rubs. 2+ distal pulses. Equal extremity pulses.    Chest: No chest wall tenderness  Musculoskeletal: On examination of the right lower extremity, there is no tenderness to palpation along the right knee, compartments are soft compressible dorsiflexion plantarflexion intact dorsalis pedis and posterior tibialis pulses are palpable. Sensation is intact to light touch. Integument: skin warm and dry. No rashes. Neurologic: GCS 15, no focal deficits, symmetric strength 5/5 in the upper and lower extremities bilaterally  Psychiatric: Normal Affect            DIAGNOSTIC RESULTS   LABS:    Labs Reviewed   CBC WITH AUTO DIFFERENTIAL - Abnormal; Notable for the following components:       Result Value    Monocytes Absolute 1.00 (*)     All other components within normal limits   D-DIMER, QUANTITATIVE       As interpreted by me, the above displayed labs are abnormal. All other labs obtained during this visit were within normal range or not returned as of this dictation. RADIOLOGY:   Non-plain film images such as CT, Ultrasound and MRI are read by the radiologist. Plain radiographic images are visualized and preliminarily interpreted by the ED Provider with the below findings:      Interpretation per the Radiologist below, if available at the time of this note:    XR KNEE RIGHT (3 VIEWS)    (Results Pending)     No results found. No results found. PROCEDURES   Unless otherwise noted below, none      PAST MEDICAL HISTORY/Chronic Conditions Affecting Care      has a past medical history of Anxiety. EMERGENCY DEPARTMENT COURSE    Vitals:    Vitals:    02/22/23 0359 02/22/23 0404   BP:  121/60   Pulse: 77 79   Resp:  18   Temp: 98.7 °F (37.1 °C)    TempSrc: Oral    SpO2: 100% 100%   Weight:  180 lb (81.6 kg)   Height:  5' 6\" (1.676 m)       Patient was given the following medications:  Medications   naproxen (NAPROSYN) tablet 500 mg (500 mg Oral Given 2/22/23 0429)           Is this patient to be included in the SEP-1 Core Measure due to severe sepsis or septic shock? No Exclusion criteria - the patient is NOT to be included for SEP-1 Core Measure due to:  Infection is not suspected        Medical Decision Making/Differential Diagnosis:    CC/HPI Summary, Social Determinants of health, Records Reviewed, DDx, testing done/not done, ED Course, Reassessment, disposition considerations/shared decision making with patient, consults, disposition:            CONSULTS: (Who and What was discussed)  None    Discussion with Other Profesionals : None    Social Determinants : None    Records Reviewed : None    CC/HPI Summary, DDx, ED Course, and Reassessment:   Patient is a 50-year-old female past medical history anxiety. Patient presents with chief complaint of right leg pain. Vital signs stable on presentation. On physical exam heart regular rate and rhythm, lungs clear to auscultation bilaterally, abdomen soft nontender no rebound or guarding. On examination the right lower extremity no tenderness palpation, compartment soft compressible sensation intact plantar dorsiflexion intact, dorsalis pedis and posterior tibialis pulses are intact. Differential diagnosis includes paresthesias, DVT, fracture. We do not have ultrasound present at this time laboratory work was obtained CBC unremarkable, D-dimer was less than 200, right knee x-rays were ordered and are pending, patient is also given 500 mg of p.o. Naprosyn. Was notified by nursing staff the patient had eloped from the emergency department prior to completion of work-up as well as discussion of results. Nursing staff attempted to have patient wait for provider to discuss laboratory work and findings with patient however she eloped prior to this provider being able to address results with patient. Disposition Considerations (Tests not ordered but considered, Shared Decision Making, Pt Expectation of Test or Tx.): Was notified by nursing staff that patient had eloped.   Nursing staff tried to convince patient to stay to be seen by provider again however patient stated that she needed to go to work and left prior to provider being able to discuss results with patient. FINAL IMPRESSION      1. Right leg pain          DISPOSITION/PLAN     DISPOSITION Eloped - Left Before Treatment Complete 02/22/2023 05:46:12 AM      PATIENT REFERRED TO:  No follow-up provider specified.     DISCHARGE MEDICATIONS:  Discharge Medication List as of 2/22/2023  5:46 AM          DISCONTINUED MEDICATIONS:  Discharge Medication List as of 2/22/2023  5:46 AM                 (Please note that portions of this note were completed with a voice recognition program.  Efforts were made to edit the dictations but occasionally words are mis-transcribed.)    Tahir Corona DO (electronically signed)           Soraya Mayo DO  Resident  02/22/23 9912

## 2024-12-19 LAB — CULTURE: NORMAL

## 2025-03-07 LAB
CLINICAL INFORMATION: NORMAL
COMMENT: NORMAL
DATE OF PREVIOUS BIOPSY: NORMAL
DATE OF PREVIOUS PAP: NORMAL
HB: COMMENT: NORMAL
HB: CYTOTECHNOLT: NORMAL
HB: INTERPT: NORMAL
HB: SOURCE: NORMAL
HPV MRNA E6/E7: NOT DETECTED
LAST MENSTRUAL PERIOD: NORMAL
SPECIMEN ADEQUACY:: NORMAL